# Patient Record
Sex: FEMALE | Race: WHITE | NOT HISPANIC OR LATINO
[De-identification: names, ages, dates, MRNs, and addresses within clinical notes are randomized per-mention and may not be internally consistent; named-entity substitution may affect disease eponyms.]

---

## 2017-06-29 ENCOUNTER — FORM ENCOUNTER (OUTPATIENT)
Age: 72
End: 2017-06-29

## 2022-09-29 DIAGNOSIS — Z86.59 PERSONAL HISTORY OF OTHER MENTAL AND BEHAVIORAL DISORDERS: ICD-10-CM

## 2022-09-29 DIAGNOSIS — M19.072 PRIMARY OSTEOARTHRITIS, LEFT ANKLE AND FOOT: ICD-10-CM

## 2022-09-29 DIAGNOSIS — M72.2 PLANTAR FASCIAL FIBROMATOSIS: ICD-10-CM

## 2022-09-29 DIAGNOSIS — Z98.890 OTHER SPECIFIED POSTPROCEDURAL STATES: ICD-10-CM

## 2022-09-29 DIAGNOSIS — Z80.9 FAMILY HISTORY OF MALIGNANT NEOPLASM, UNSPECIFIED: ICD-10-CM

## 2022-09-29 DIAGNOSIS — S90.229A CONTUSION OF UNSPECIFIED LESSER TOE(S) WITH DAMAGE TO NAIL, INITIAL ENCOUNTER: ICD-10-CM

## 2022-09-29 PROBLEM — Z00.00 ENCOUNTER FOR PREVENTIVE HEALTH EXAMINATION: Status: ACTIVE | Noted: 2022-09-29

## 2022-09-29 RX ORDER — DICLOFENAC EPOLAMINE 0.01 G/1
1.3 SYSTEM TOPICAL
Refills: 0 | Status: ACTIVE | COMMUNITY

## 2022-09-29 RX ORDER — BUPROPION HYDROCHLORIDE 100 MG/1
100 TABLET, FILM COATED ORAL
Refills: 0 | Status: ACTIVE | COMMUNITY

## 2022-09-30 ENCOUNTER — APPOINTMENT (OUTPATIENT)
Dept: PODIATRY | Facility: CLINIC | Age: 77
End: 2022-09-30

## 2022-09-30 VITALS — WEIGHT: 130 LBS | BODY MASS INDEX: 23.92 KG/M2 | HEIGHT: 62 IN

## 2022-09-30 PROCEDURE — 11721 DEBRIDE NAIL 6 OR MORE: CPT | Mod: 59

## 2022-09-30 PROCEDURE — 11750 EXCISION NAIL&NAIL MATRIX: CPT | Mod: T5

## 2022-10-07 NOTE — HISTORY OF PRESENT ILLNESS
[Sneakers] : roddy [FreeTextEntry1] : Patient presents today for management of painful onychomycotic nails as well as right lateral hallux ingrown toenail that she has had for several years.  She has been managing it by cutting it herself as well as continued debridement; however, there is a nail spicule there and she finds it very difficult to last in between visits.  She denies any purulent drainage or redness or swelling to the area.  She has tried to cut it herself without any improvement.

## 2022-10-07 NOTE — PHYSICAL EXAM
[Ankle Swelling Bilaterally] : bilaterally  [2+] : left foot dorsalis pedis 2+ [Sensation] : the sensory exam was normal to light touch and pinprick [No Focal Deficits] : no focal deficits [Deep Tendon Reflexes (DTR)] : deep tendon reflexes were 2+ and symmetric [Motor Exam] : the motor exam was normal [Ankle Swelling (On Exam)] : not present [Varicose Veins Of Lower Extremities] : not present [] : not present [FreeTextEntry3] : CFT: 3 seconds x 10.  Temperature gradient: warm to cool. [de-identified] : Forefoot varus, fully compensated.\par  [FreeTextEntry1] : Nails 1 through 5, bilateral are elongated, thickened, dystrophic with pain on palpation, distally.  Thickened to 3mm with yellow discoloration and subungual debris.  \par Left hallux subungual hematoma has been growing out.  The nail is well attached.  \par The right lateral hallux incurvated nail border with a spicule causing pain on palpation.  No clinical signs of infection, no edema, redness, increased temperature, cellulitis, drainage, purulence, malodor, soft tissue crepitus or streaking.\par

## 2022-10-07 NOTE — ASSESSMENT
[FreeTextEntry1] : Impression: Onychomycosis (B35.1).  Pain in left toes, pain in right toes (M79). Ingrown right hallux nail (L60.0).\par \par Treatment: For the onychomycosis, patient has tried topical Ketoconazole in the past without improvement and it has given her a rash.  She does not wish to continue with the topical antifungals.\par Nails right 2 through 5 and left 1 through 5 were debrided of excessive thickness and length to appropriate levels of comfort and contour using sterile nippers and Dremel.   The procedure was tolerated well without complications.\par For the right hallux ingrown toenail, discussed treatment options with the patient.  Patient prefers to have a partial nail avulsion with chemical matrixectomy as a permanent option.\par \par The consent was signed with the patient for the planned surgical procedure.  The procedure was reviewed; discussed risks and benefits.  All questions answered.  \par Procedure: Partial nail avulsion with matrixectomy with chemical matrixectomy.\par Location: Right hallux, lateral border.\par \par Report: After the digit was prepped with alcohol.  Local anesthesia was administered using 3cc’s of 1% Lidocaine, plain.  The digit was prepped with Betadine solution.  A Penrose tourniquet was applied around the base of the digit.  The symptomatic nail border was freed from its soft tissue attachment using a [place] elevator and excised en toto using a straight nail splitter and a hemostat.  The surgical site was inspected for spicules and none were found.  Two applications of 60 seconds, each, of Phenol were applied to the nail matrix.  The site was debrided using a curette in between Phenol applications.  The tourniquet was removed.  \par The site was then flushed with isopropyl alcohol.  The toe dressed with light compressive dressing and Betadine solution.  Bleeding was minimal and controlled by pressure.  \par \par Pathology: None sent\par \par Patient tolerated the procedure and anesthesia well.  Written and oral postoperative instructions were given to the patient and his mother.  Remove the operative dressing in 24 hours after the procedure and begin warm water and Epsom salt soaks, daily.  Dry the foot well and then apply one drop of Betadine or a thin layer of antibiotic cream over the area and cover with a Band-Aid until follow up appointment.  \par \par Patient to return in 2 weeks.  \par \par

## 2022-10-17 ENCOUNTER — APPOINTMENT (OUTPATIENT)
Dept: PODIATRY | Facility: CLINIC | Age: 77
End: 2022-10-17

## 2022-10-17 VITALS — BODY MASS INDEX: 23.92 KG/M2 | WEIGHT: 130 LBS | HEIGHT: 62 IN

## 2022-10-17 PROCEDURE — 99212 OFFICE O/P EST SF 10 MIN: CPT

## 2022-10-22 NOTE — ASSESSMENT
[FreeTextEntry1] : Impression: Ingrown right hallux nail (L60.0).\par \par Treatment: Patient can discontinue all antibiotic creams and soaking.  Just monitor the area for any recurrence.  \par Return: 66 days for routine debridement.

## 2022-10-22 NOTE — HISTORY OF PRESENT ILLNESS
[Other: ____] : [unfilled] [FreeTextEntry1] : Patient presents today for follow up evaluation of right, lateral hallux partial nail avulsion with matrixectomy, 2 weeks ago.  Patient states that it is feeling a lot better.  There is no redness, drainage or pain to the area. She has been soaking it and applying antibiotic cream to the area but inconsistently.

## 2022-10-22 NOTE — PHYSICAL EXAM
[Ankle Swelling Bilaterally] : bilaterally  [2+] : left foot dorsalis pedis 2+ [Sensation] : the sensory exam was normal to light touch and pinprick [No Focal Deficits] : no focal deficits [Deep Tendon Reflexes (DTR)] : deep tendon reflexes were 2+ and symmetric [Motor Exam] : the motor exam was normal [Ankle Swelling (On Exam)] : not present [Varicose Veins Of Lower Extremities] : not present [] : not present [FreeTextEntry3] : CFT: 3 seconds x 10.  Temperature gradient: warm to cool. [de-identified] : Forefoot varus, fully compensated.\par  [FreeTextEntry1] : Nails 1 through 5, bilateral are elongated, thickened, dystrophic with pain on palpation, distally.  Thickened to 3mm with yellow discoloration and subungual debris.  \par Left hallux subungual hematoma has been growing out.  The nail is well attached.  \par The right lateral hallux, S/P P&A with no residual spicules, no clinical signs of infection.

## 2022-11-26 ENCOUNTER — EMERGENCY (EMERGENCY)
Facility: HOSPITAL | Age: 77
LOS: 1 days | Discharge: HOME | End: 2022-11-26
Attending: STUDENT IN AN ORGANIZED HEALTH CARE EDUCATION/TRAINING PROGRAM
Payer: MEDICARE

## 2022-11-26 VITALS
TEMPERATURE: 98 F | SYSTOLIC BLOOD PRESSURE: 120 MMHG | RESPIRATION RATE: 16 BRPM | OXYGEN SATURATION: 96 % | HEART RATE: 89 BPM | WEIGHT: 175.05 LBS | HEIGHT: 63 IN | DIASTOLIC BLOOD PRESSURE: 80 MMHG

## 2022-11-26 PROCEDURE — 73110 X-RAY EXAM OF WRIST: CPT | Mod: 26,LT

## 2022-11-26 PROCEDURE — 73080 X-RAY EXAM OF ELBOW: CPT | Mod: 26,LT

## 2022-11-26 PROCEDURE — 99284 EMERGENCY DEPT VISIT MOD MDM: CPT

## 2022-11-26 RX ORDER — TETANUS TOXOID, REDUCED DIPHTHERIA TOXOID AND ACELLULAR PERTUSSIS VACCINE, ADSORBED 5; 2.5; 8; 8; 2.5 [IU]/.5ML; [IU]/.5ML; UG/.5ML; UG/.5ML; UG/.5ML
0.5 SUSPENSION INTRAMUSCULAR ONCE
Refills: 0 | Status: COMPLETED | OUTPATIENT
Start: 2022-11-26 | End: 2022-11-26

## 2022-11-26 RX ORDER — ACETAMINOPHEN 500 MG
975 TABLET ORAL ONCE
Refills: 0 | Status: COMPLETED | OUTPATIENT
Start: 2022-11-26 | End: 2022-11-26

## 2022-11-26 RX ORDER — IBUPROFEN 200 MG
600 TABLET ORAL ONCE
Refills: 0 | Status: COMPLETED | OUTPATIENT
Start: 2022-11-26 | End: 2022-11-26

## 2022-11-26 RX ORDER — OXYCODONE HYDROCHLORIDE 5 MG/1
5 TABLET ORAL ONCE
Refills: 0 | Status: DISCONTINUED | OUTPATIENT
Start: 2022-11-26 | End: 2022-11-26

## 2022-11-26 RX ADMIN — OXYCODONE HYDROCHLORIDE 5 MILLIGRAM(S): 5 TABLET ORAL at 22:35

## 2022-11-26 RX ADMIN — Medication 600 MILLIGRAM(S): at 22:35

## 2022-11-26 RX ADMIN — Medication 975 MILLIGRAM(S): at 22:35

## 2022-11-26 NOTE — ED PROVIDER NOTE - CARE PROVIDER_API CALL
Salud Abdul (MD)  Orthopaedic Surgery  [address] [place] [address], [address]  [place] [address]  Phone: [phone]  Fax: [phone]  Follow Up Time: 1-3 Days

## 2022-11-26 NOTE — ED PROVIDER NOTE - PHYSICAL EXAMINATION
Attending/MD Curtis.     Left Wrist Exam  Sensation: sensation intact to light touch in first dorsalis web space, 5th/3rd finger volar tip.  Motor: right: FPL, FDS, FDP grossly intact per routine. Extensor mechanisms grossly intact per routine. Able to oppose thumb to pinky. No over evidence of malrotation. Median recurrent nerve intact to fine touch per routine.  ROM: right: wrist frexion, extension, AB/AD-duction intact to rom.  Skin/Inspection: No erythema, + small abrasion, + derformitie on the distal portion of fore arm. Inspection: No snuff box tenderness.  Vascular: CRT<2sec in all digits.

## 2022-11-26 NOTE — ED PROVIDER NOTE - CLINICAL SUMMARY MEDICAL DECISION MAKING FREE TEXT BOX
Attending/MD Curtis. 76 yo F, no known pMH, mechanical fall, today, FOOSH, deformity of the left distal fore arm, high suspicion for distal radial fracture with displacement, pt is in comfort but will need reduction, giving pain meds, now and xray, ortho, likely dc with ortho out pt follow up.

## 2022-11-26 NOTE — ED ADULT TRIAGE NOTE - CHIEF COMPLAINT QUOTE
fall down 2-3 steps, hit head- only a scratch on the nose. denies thinner use, denies LOC, denies headache. left wrist deformity noted, endorsing pain

## 2022-11-26 NOTE — ED PROVIDER NOTE - NSFOLLOWUPINSTRUCTIONS_ED_ALL_ED_FT
Thank you for visiting our Emergency Department, it has been a pleasure taking part in your healthcare.    You had a thorough evaluation including an exam, labs and imaging. You were given medications for comfort. Your workup did not demonstrate any concerning findings. This does not mean that your pain is not real, only that we were unable to find a dangerous or life-threatening cause. Please read the attached information sheets as they will provide useful information regarding your condition.    Your discharge diagnosis is: left radial fracture  Return precautions to the Emergency Department include but are not limited to: unrelenting nausea, vomiting, fever, chest pain, shortness of breath, chest or abdominal pain, worsening back pain, syncope, blood in urine or stool, headache that doesn't resolve, numbness or tingling, loss of sensation, loss of motor function, or any other concerning symptoms.    1) Follow up with your primary care doctor within 48 hours. Please call [phone] to make an appointment or with any questions you may have.  or call [phone] to make an appointment with the clinic  2) You should also establish care with Orthopedics by calling  [phone] to find a doctor affiliated with [facility] in your neighborhood & network. Please bring your labs and imaging with you to your appointment, as needed.  3) Take Tylenol 650-1000mg every six hours and supplement with ibuprofen 400-600mg, with food, every six hours which can be taken three hours apart from the Tylenol to have a layered effect. Please do not take these medications if you do no have pain or if you have any history of bleeding disorders, kidney or liver disease. Do not use ibuprofen if you are on blood thinners (anti-coagulation).  4) Drink at least 2 Liters or 64 Ounces of water each day.

## 2022-11-26 NOTE — ED PROVIDER NOTE - ATTENDING CONTRIBUTION TO CARE
I have personally seen and examined this patient.  I have fully participated in the care of this patient. I performed a substantive portion of the visit including all aspects of the medical decision making. I have reviewed all pertinent clinical information, including history, physical exam, plan and the Resident’s note and agree except as noted. - MD Curtis.    see mdm

## 2022-11-26 NOTE — ED PROVIDER NOTE - PATIENT PORTAL LINK FT
You can access the FollowMyHealth Patient Portal offered by [facility] by registering at the following website: [email or web address]. By joining [facility]’s FollowMyHealth portal, you will also be able to view your health information using other applications (apps) compatible with our system.

## 2022-11-26 NOTE — ED PROVIDER NOTE - PROGRESS NOTE DETAILS
Attending/MD Curtis. ortho resident paged 20 minutes ago, pending ortho consult, distal radial and ulnar fractures, Attending/MD Curtis. ortho resident, Arvin Wren, paged 20 minutes ago, pending ortho, distal radial and ulnar fractures, Christiano Dewitt MD: Patient seen by ortho and had wrist reduced and splinted with satisfactory post-reduction films and recommending outpatient follow up in 3 days.

## 2022-11-26 NOTE — ED PROVIDER NOTE - OBJECTIVE STATEMENT
77y F w/ no pertinent PMHx presents to the ED c/o B/L hand pain s/p mechanical trip and fall down stairs earlier today. Denies LOC, head trauma. Denies AC use. Denies numbness, tingling, weakness. Pt is not UTD w/ TDAP. 77y F w/ no pertinent PMHx presents to the ED c/o B/L hand pain s/p mechanical trip and fall down stairs earlier today. Denies LOC, head trauma. Denies AC use. Denies numbness, tingling, weakness. Pt is not UTD w/ TDAP. NKDA.

## 2022-11-26 NOTE — ED ADULT NURSE NOTE - OBJECTIVE STATEMENT
76yo F with no PMH presents to ED s/p fall. Pt states she had a mechanical trip and fall downs the stairs earlier today and is now having b/l hand pain more to the left wrist. Pt was able to ambulate fine after fall. Pt endorses 5/10 pain, denies taking anything for it. Denies chest pain, SOB, N/V, headstrike, LOC, blood thinner use, weakness, lightheadedness, numbness/tingling to extremities. A&Ox3. Strong peripheral pulses. Neurologically intact and follows commands. Pulse motor and sensation intact to all 4 extremities. Minor swelling to left wrist, no other obvious swelling, bruising, and deformities on inspection. Skin warm dry intact and normal for ethnicity. Ambulatory with steady gait in ED. Stretcher locked and in lowest position, appropriate side rails up. Pt instructed to notify RN if assistance is needed.

## 2022-11-27 VITALS
OXYGEN SATURATION: 96 % | RESPIRATION RATE: 18 BRPM | SYSTOLIC BLOOD PRESSURE: 116 MMHG | DIASTOLIC BLOOD PRESSURE: 70 MMHG | HEART RATE: 68 BPM

## 2022-11-27 PROCEDURE — 73130 X-RAY EXAM OF HAND: CPT | Mod: 26,LT

## 2022-11-27 PROCEDURE — 99285 EMERGENCY DEPT VISIT HI MDM: CPT | Mod: 25

## 2022-11-27 PROCEDURE — 90715 TDAP VACCINE 7 YRS/> IM: CPT

## 2022-11-27 PROCEDURE — 25605 CLTX DST RDL FX/EPHYS SEP W/: CPT | Mod: LT

## 2022-11-27 PROCEDURE — 73080 X-RAY EXAM OF ELBOW: CPT

## 2022-11-27 PROCEDURE — 73090 X-RAY EXAM OF FOREARM: CPT

## 2022-11-27 PROCEDURE — 73110 X-RAY EXAM OF WRIST: CPT

## 2022-11-27 PROCEDURE — 73130 X-RAY EXAM OF HAND: CPT

## 2022-11-27 PROCEDURE — 73090 X-RAY EXAM OF FOREARM: CPT | Mod: 26,LT

## 2022-11-27 PROCEDURE — 73110 X-RAY EXAM OF WRIST: CPT | Mod: 26,LT

## 2022-11-27 PROCEDURE — 90471 IMMUNIZATION ADMIN: CPT

## 2022-11-27 RX ADMIN — TETANUS TOXOID, REDUCED DIPHTHERIA TOXOID AND ACELLULAR PERTUSSIS VACCINE, ADSORBED 0.5 MILLILITER(S): 5; 2.5; 8; 8; 2.5 SUSPENSION INTRAMUSCULAR at 00:09

## 2022-11-27 NOTE — CONSULT NOTE ADULT - SUBJECTIVE AND OBJECTIVE BOX
77yFemale presents to [facility] ED c/o severe L wrist pain s/p mechanical fall. Patient denies head hit or LOC. Localizing pain to distal radius. Denies radiation of pain. Pt denies numbness, tingling or burning. R Hand Dominant. Patient denies any other injuries.    PMH:  No pertinent past medical history      PSH:  No significant past surgical history      AH:    Meds: See med rec    T(C): 36.8 (11-26-22 @ 23:15)  HR: 82 (11-26-22 @ 23:15)  BP: 132/78 (11-26-22 @ 23:15)  RR: 16 (11-26-22 @ 23:15)  SpO2: 97% (11-26-22 @ 23:15)  Wt(kg): --    PE L UE:  Skin intact, visible deformity of wrist, + soft tissue swelling, no ecchymosis; Decreased ROM of Wrist 2/2 pain. Normal Elbow/Shoulder ROM w/o pain. + TTP over DR/Ulna. + Rad Pulse 2+/4. SILT C5-T1, +AIN/PIN/Ulnar/Radial/Musc/Median, soft compartments;    R UE / B/L LE:  No bony TTP; Good ROM w/o pain. Able to SLR B/L. Exam Unremarkable.     Imaging:  XRay L Wrist  3 views of L Wrist demonstrates R/L distal radius fracture, intra-articular w/ anterior displacement of carpus/hand in relation to forearm. No other fx/dislocations noted.     Procedure Note:  After verbal consent obtained, ~ 10 cc of 1% Lidocaine injected into area around DR/Ulna as hematoma block. UE hung by fingers and reduction maneuver performed. Sugartong splint applied to Forearm/Wrist and mold held. VT XR obtained which show improved alignment of L DR Fracture. Pt NVI post procedure. Pt tolerated procedure well.    A/P: 77yFemale s/p Mech Fall w/ L Distal Radius Fracture  - Pain control  - Strict Ice/Elevation  - NWB L UE with splint and sling  - Keep splint clean/dry/intact;  - Encourage active finger motion to help with swelling  - Pt aware of possible need for surgical intervention of distal radius fracture. Will FU as outpatient  - Pt made aware of signs and symptoms of compartment syndrome. Aware of need to contact Doctor / Return to ED if symtoms arise.  - All Pt's / Family Members questions answered, Pt/family understand plan.  - MEENA w/ Dr. Abdul in 2-3 days.

## 2022-12-20 ENCOUNTER — APPOINTMENT (OUTPATIENT)
Dept: PODIATRY | Facility: CLINIC | Age: 77
End: 2022-12-20
Payer: MEDICARE

## 2022-12-20 PROBLEM — Z78.9 OTHER SPECIFIED HEALTH STATUS: Chronic | Status: ACTIVE | Noted: 2022-11-26

## 2022-12-20 PROCEDURE — 73630 X-RAY EXAM OF FOOT: CPT | Mod: RT

## 2022-12-20 PROCEDURE — 99213 OFFICE O/P EST LOW 20 MIN: CPT | Mod: 25

## 2022-12-20 PROCEDURE — 11721 DEBRIDE NAIL 6 OR MORE: CPT

## 2022-12-28 NOTE — PHYSICAL EXAM
[2+] : left foot dorsalis pedis 2+ [Sensation] : the sensory exam was normal to light touch and pinprick [No Focal Deficits] : no focal deficits [Deep Tendon Reflexes (DTR)] : deep tendon reflexes were 2+ and symmetric [Motor Exam] : the motor exam was normal [Ankle Swelling (On Exam)] : not present [Varicose Veins Of Lower Extremities] : not present [] : not present [FreeTextEntry3] : CFT: 3 seconds x10. Temperature gradient: warm to cool. [de-identified] : Right 5th toe redness and swelling. No increase in warmth. Mild pain on palpation. No limitation on DIPJ, PIPJ or MPJ ROM. No crepitus. Muscle Power 5/5 of all pedal groups. [FreeTextEntry1] : Nails 1 through 5, bilateral are elongated, thickened, dystrophic with pain on palpation, distally. Thickened to 3mm with yellow discoloration and subungual debris. Left hallux subungual hematoma has been growing out. The nail is well attached.  Patient is S/P right lateral hallux PYA with no recurrence and no nail spicules. No open lesions. No clinical signs of infection.\par

## 2022-12-28 NOTE — PROCEDURE
[FreeTextEntry1] : \par X-ray Report: Right foot - 3 views, weight-bearing were taken to evaluate for underlying fracture. There is nondisplaced radiolucency of 5th proximal phalanx head going through the PIPJ with no displacement and no diastasis. No other fractures, erosions or dislocations present.

## 2022-12-28 NOTE — ASSESSMENT
[FreeTextEntry1] : \par Impression:  Onychomycosis. Pain in toes bilateral. Right 5th toe proximal phalanx nondisplaced fracture, closed, initial.\par \par Treatment: For the right 5th toe fracture I advised patient to wear shoes with an adequately wide and tall toe box to tolerance. Avoid prolonged physical activity. We will treat conservatively as there is already signs of healing on the x-ray. If the toe is still painful in 3 to 4 weeks I advised her to return for repeat imaging. Protect the toe from further injury.\par For onychomycosis, nails 1 through 5 bilaterally were debrided of excessive thickness and length to appropriate levels of comfort and contour using sterile nippers and Dremel.   The procedure was tolerated well without complications.  \par I will see her back in 66 days.\par

## 2022-12-28 NOTE — HISTORY OF PRESENT ILLNESS
[FreeTextEntry1] : Patient returns today for management of painful elongated onychomycotic toenails as well as a new issue of right 5th toe pain and redness. The patient states that she fell on November 26th and injured her left wrist and had to have left wrist surgery. She thinks that she might have banged her toe however she did not seek any medical care. It has been slowly improving in pain however there is still redness and swelling.\par

## 2023-02-28 ENCOUNTER — APPOINTMENT (OUTPATIENT)
Dept: PODIATRY | Facility: CLINIC | Age: 78
End: 2023-02-28
Payer: MEDICARE

## 2023-02-28 DIAGNOSIS — S92.514A NONDISPLACED FRACTURE OF PROXIMAL PHALANX OF RIGHT LESSER TOE(S), INITIAL ENCOUNTER FOR CLOSED FRACTURE: ICD-10-CM

## 2023-02-28 PROCEDURE — 99212 OFFICE O/P EST SF 10 MIN: CPT | Mod: 25

## 2023-02-28 PROCEDURE — 11721 DEBRIDE NAIL 6 OR MORE: CPT

## 2023-02-28 PROCEDURE — 73630 X-RAY EXAM OF FOOT: CPT | Mod: RT

## 2023-03-01 PROBLEM — S92.514A CLOSED NONDISPLACED FRACTURE OF PROXIMAL PHALANX OF LESSER TOE OF RIGHT FOOT, INITIAL ENCOUNTER: Status: ACTIVE | Noted: 2022-12-21

## 2023-03-01 NOTE — REASON FOR VISIT
[Follow-Up Visit] : a follow-up visit for [Confirmed Foot Fracture] : confirmed foot fracture [Onychomycosis] : onychomycosis

## 2023-03-04 NOTE — HISTORY OF PRESENT ILLNESS
[Sneakers] : roddy [FreeTextEntry1] : Patient returns today for management of painful elongated onychomycotic toenails as well as for follow up of right 5th toe fracture that she sustained on 11/26/22.  She states that overall, the toe has been feeling better.  She still gets some mild red discoloration of the toe at the end of the day and she feels occasional discomfort when she touches the toe.  She is otherwise able to do all of her activities of daily living and wear shoe gear.

## 2023-03-04 NOTE — PHYSICAL EXAM
[Ankle Swelling Bilaterally] : bilaterally  [2+] : left foot dorsalis pedis 2+ [Sensation] : the sensory exam was normal to light touch and pinprick [No Focal Deficits] : no focal deficits [Deep Tendon Reflexes (DTR)] : deep tendon reflexes were 2+ and symmetric [Motor Exam] : the motor exam was normal [Ankle Swelling (On Exam)] : not present [Varicose Veins Of Lower Extremities] : not present [] : not present [FreeTextEntry3] : CFT: 3 seconds x 10.  Temperature gradient: warm to cool. [de-identified] : Right 5th toe, minimal redness and swelling compared to contralateral limb.  Increased in warmth.  Minimal pain on palpation.  No limitation in pedal joints ROM, no crepitus.  Muscle power: 5/5, all pedal groups.  [FreeTextEntry1] : Nails 1 through 5, bilateral are elongated, thickened, dystrophic with pain on palpation, distally.  Thickened to 3mm with yellow discoloration and subungual debris.  \par Left hallux subungual hematoma has been growing out.  The nail is well attached.  \par The right lateral hallux, S/P P&A with no residual spicules, no clinical signs of infection.

## 2023-03-04 NOTE — ASSESSMENT
[FreeTextEntry1] : Impression: Onychomycosis (B35.1).  Pain in toes, bilateral (M79).  Right 5th proximal phalanx non-displaced fracture, healed, sequela (S92.160S).\par \par Treatment: For the right 5th toe fracture, I reviewed the x-rays with the patient.  Advised patient to continue activities to tolerance and wear shoes with a wide/tall toebox.  The fracture has healed with immature bone; this is a normal fracture healing progression.  Advised patient to protect the area from re-injury.  \par Nails 1 through 5, bilaterally were debrided of excessive thickness and length to appropriate levels of comfort and contour using sterile nippers and Dremel.   The procedure was tolerated well without complications.\par Failure to perform this treatment based on patient's underlying condition could lead to ulceration and infection.\par Return: 64 days.

## 2023-03-04 NOTE — PROCEDURE
[FreeTextEntry1] : X-rays of the right foot were taken to evaluate for healing of fracture.\par (3 views - weight bearing) The previously seen radiolucency of the 5th proximal phalangeal head is fully consolidated.  No other fracture/dislocations or erosions.  Incomplete synostosis of 5th middle and distal phalanges unchanged from prior.

## 2023-05-09 ENCOUNTER — APPOINTMENT (OUTPATIENT)
Dept: PODIATRY | Facility: CLINIC | Age: 78
End: 2023-05-09

## 2023-05-18 ENCOUNTER — APPOINTMENT (OUTPATIENT)
Dept: PODIATRY | Facility: CLINIC | Age: 78
End: 2023-05-18

## 2023-06-08 ENCOUNTER — APPOINTMENT (OUTPATIENT)
Dept: PODIATRY | Facility: CLINIC | Age: 78
End: 2023-06-08
Payer: MEDICARE

## 2023-06-08 PROCEDURE — 11721 DEBRIDE NAIL 6 OR MORE: CPT

## 2023-06-10 NOTE — PHYSICAL EXAM
[Ankle Swelling Bilaterally] : bilaterally  [2+] : left foot dorsalis pedis 2+ [Sensation] : the sensory exam was normal to light touch and pinprick [No Focal Deficits] : no focal deficits [Deep Tendon Reflexes (DTR)] : deep tendon reflexes were 2+ and symmetric [Motor Exam] : the motor exam was normal [Ankle Swelling (On Exam)] : not present [Varicose Veins Of Lower Extremities] : not present [] : not present [de-identified] : No limitations on pedal joints ROM.  No crepitus.  Muscle power: 5/5, all pedal groups.  [FreeTextEntry3] : CFT: 3 seconds x 10.  Temperature gradient: warm to cool. [FreeTextEntry1] : Nails 1 through 5, bilateral are elongated, thickened, dystrophic with pain on palpation, distally.  Thickened to 3mm with yellow discoloration and subungual debris.  \par Left hallux, lateral nail border is deeply incurvated with pain without any drainage or clinical signs of infection.

## 2023-06-10 NOTE — HISTORY OF PRESENT ILLNESS
[Sneakers] : roddy [FreeTextEntry1] : Patient returns today for management of painful elongated onychomycotic toenails as well as ingrown nails that she cannot care for herself.  She reports no new medical problems.  The pain in the right 5th toe is resolved.

## 2023-06-10 NOTE — ASSESSMENT
[FreeTextEntry1] : Impression: Onychomycosis (B35.1).  Pain in toes, bilateral (M79).  Ingrown nail (L60.0).\par \par Treatment: \par Nails 1 through 5, bilaterally were debrided of excessive thickness and length to appropriate levels of comfort and contour using sterile nippers and Dremel.   The procedure was tolerated well without complications.\par All incurvated nail borders were resected via slant back with a sterile nipper with resolution of pain.  \par \par Return: 64 days.

## 2023-08-24 ENCOUNTER — APPOINTMENT (OUTPATIENT)
Dept: PODIATRY | Facility: CLINIC | Age: 78
End: 2023-08-24
Payer: MEDICARE

## 2023-08-24 PROCEDURE — 11721 DEBRIDE NAIL 6 OR MORE: CPT

## 2023-08-29 NOTE — ASSESSMENT
[FreeTextEntry1] : Impression: Onychomycosis (B35.1).  Pain in toes, bilateral (M79).  Ingrown nail (L60.0).  Treatment:  Nails 1 through 5, bilaterally were debrided of excessive thickness and length to appropriate levels of comfort and contour using sterile nippers and Dremel.   The procedure was tolerated well without complications. All incurvated nail borders were resected via slant back with a sterile nipper with resolution of pain.   Return: 64 days.

## 2023-08-29 NOTE — PHYSICAL EXAM
[Ankle Swelling Bilaterally] : bilaterally  [2+] : left foot dorsalis pedis 2+ [Sensation] : the sensory exam was normal to light touch and pinprick [No Focal Deficits] : no focal deficits [Deep Tendon Reflexes (DTR)] : deep tendon reflexes were 2+ and symmetric [Motor Exam] : the motor exam was normal [Ankle Swelling (On Exam)] : not present [Varicose Veins Of Lower Extremities] : not present [] : not present [FreeTextEntry3] : CFT: 3 seconds x 10.  Temperature gradient: warm to cool. [de-identified] : No limitations on pedal joints ROM.  No crepitus.  Muscle power: 5/5, all pedal groups.  [FreeTextEntry1] : Nails 1 through 5, bilateral are elongated, thickened, dystrophic with pain on palpation, distally.  Thickened to 3mm with yellow discoloration and subungual debris.  \par  Left hallux, lateral nail border is deeply incurvated with pain without any drainage or clinical signs of infection.

## 2023-08-29 NOTE — HISTORY OF PRESENT ILLNESS
[Sneakers] : roddy [FreeTextEntry1] : Patient returns today for management of painful elongated onychomycotic toenails as well as ingrown nails that she cannot care for herself.  She did attempt to cut the nails; however she was unsuccessful.  She reports no new medical problems.

## 2023-11-02 ENCOUNTER — APPOINTMENT (OUTPATIENT)
Dept: PODIATRY | Facility: CLINIC | Age: 78
End: 2023-11-02
Payer: MEDICARE

## 2023-11-02 PROCEDURE — 11721 DEBRIDE NAIL 6 OR MORE: CPT

## 2023-11-27 NOTE — ED ADULT TRIAGE NOTE - ADDITIONAL SAFETY/BANDS...
H&P reviewed. The patient was examined and there are no changes to the H&P.   Additional Safety/Bands:

## 2024-01-11 ENCOUNTER — APPOINTMENT (OUTPATIENT)
Dept: PODIATRY | Facility: CLINIC | Age: 79
End: 2024-01-11

## 2024-02-13 ENCOUNTER — APPOINTMENT (OUTPATIENT)
Dept: PODIATRY | Facility: CLINIC | Age: 79
End: 2024-02-13

## 2024-02-15 ENCOUNTER — APPOINTMENT (OUTPATIENT)
Dept: PODIATRY | Facility: CLINIC | Age: 79
End: 2024-02-15
Payer: MEDICARE

## 2024-02-15 PROCEDURE — 11721 DEBRIDE NAIL 6 OR MORE: CPT

## 2024-02-15 PROCEDURE — 99212 OFFICE O/P EST SF 10 MIN: CPT | Mod: 25

## 2024-02-21 NOTE — HISTORY OF PRESENT ILLNESS
[FreeTextEntry1] : Patient returns today for management of painful onychomycotic nails and ingrowns. Patient states that her right hallux lateral nail border has been painful for the past week. She denies any drainage or trauma. Denies any fevers or chills. No new medical changes.

## 2024-02-21 NOTE — PHYSICAL EXAM
[2+] : left foot dorsalis pedis 2+ [Sensation] : the sensory exam was normal to light touch and pinprick [No Focal Deficits] : no focal deficits [Deep Tendon Reflexes (DTR)] : deep tendon reflexes were 2+ and symmetric [Motor Exam] : the motor exam was normal [Ankle Swelling (On Exam)] : not present [Varicose Veins Of Lower Extremities] : not present [] : not present [FreeTextEntry3] : CFT: 3 seconds x10. Temperature gradient: warm to cool. [de-identified] : No limitations on pedal joints ROM. No crepitus. Muscle power: 5/5, all pedal groups. [FreeTextEntry1] : Right hallux lateral nail border incurvated with pain on palpation. Mild redness and swelling without increased temperature, purulence, drainage or malodor, spicules or granulomas. Nails 1 through 5, bilateral are elongated, thickened, dystrophic with pain on palpation, distally. Thickened to 3mm with yellow discoloration and subungual debris consistent with onychomycosis.

## 2024-02-21 NOTE — ASSESSMENT
[FreeTextEntry1] : Impression: Onychomycosis with pain. Right hallux ingrown nail with mild paronychia.  Treatment: The right hallux ingrown nail was resected via slant-back. Neosporin dressing was applied. I advised patient to soak her feet in Epsom salt soaks for one week and monitor for any worsening symptoms. If symptoms do not resolve in one week patient is to return back for a partial nail avulsion. Nails 1 through 5 were wiped with alcohol and debrided of excessive thickness and length to appropriate levels of comfort and contour using sterile nippers and Dremel.  The procedure was tolerated well without complications. Will see patient back in 63 days.

## 2024-04-18 ENCOUNTER — APPOINTMENT (OUTPATIENT)
Dept: PODIATRY | Facility: CLINIC | Age: 79
End: 2024-04-18
Payer: MEDICARE

## 2024-04-18 PROCEDURE — 11721 DEBRIDE NAIL 6 OR MORE: CPT

## 2024-04-18 PROCEDURE — 99212 OFFICE O/P EST SF 10 MIN: CPT | Mod: 25

## 2024-04-23 NOTE — HISTORY OF PRESENT ILLNESS
[FreeTextEntry1] : Patient presents today for management of thickened, discolored nails, which she states are painful in shoe gear.  She has a history of recurrent ingrown nails, which have been cut out in the past.  She states she is better compared to previous visit.  Denies any changes in the medical histories or any medications.  She is only anti-depressant medications.  She presents wearing a rounded toebox sneaker type shoe.

## 2024-04-23 NOTE — ASSESSMENT
[FreeTextEntry1] : Impression: Onychomycosis (B35.1).  Xerosis (L85.3).  Pain in toes, bilateral (M79).  Ingrown nail (L60.0).  Treatment: Discussed findings and conditions with the patient.  Discussed pedal care.  She is to soak the nails in a 1:1 mixture of white vinegar and warm water for 10 minutes to help clean the nails and remove subungual debris.  Also, recommended Dr. Hussein's nail cuticle oil which can be applied daily to help with the dried cuticles and surrounding skin.  Avoid walking barefoot.  She is to moisturize feet daily as needed.   All N=nails 1 through 5, bilaterally were prepped and manually and mechanically debrided.  The nails were smoothed and reduced in height with a rotary karl without incident.   The offending nail borders were removed via distal slant backs and the subungual debris was curettaged.   Return: 2 - 3 months.  With any pain, problems or concerns, patient is to contact the office.

## 2024-04-23 NOTE — PHYSICAL EXAM
[Ankle Swelling (On Exam)] : not present [Varicose Veins Of Lower Extremities] : not present [] : not present [FreeTextEntry3] : CFT: 3 seconds x 10.  Temperature gradient: warm to cool. [de-identified] : Decreased ROM of the 1st MPJ, bilateral.  Negative pain, crepitation on ROM.  Muscle strength: 5/5, bilaterally.  [FreeTextEntry1] : Epicritic and light touch sensations intact, bilateral.

## 2024-06-21 ENCOUNTER — APPOINTMENT (OUTPATIENT)
Dept: PODIATRY | Facility: CLINIC | Age: 79
End: 2024-06-21
Payer: MEDICARE

## 2024-06-21 DIAGNOSIS — L60.0 INGROWING NAIL: ICD-10-CM

## 2024-06-21 DIAGNOSIS — B35.1 TINEA UNGUIUM: ICD-10-CM

## 2024-06-21 DIAGNOSIS — L85.3 XEROSIS CUTIS: ICD-10-CM

## 2024-06-21 DIAGNOSIS — M79.674 PAIN IN RIGHT TOE(S): ICD-10-CM

## 2024-06-21 DIAGNOSIS — M79.675 PAIN IN RIGHT TOE(S): ICD-10-CM

## 2024-06-21 PROCEDURE — 11721 DEBRIDE NAIL 6 OR MORE: CPT

## 2024-06-24 PROBLEM — L60.0 INGROWN NAIL: Status: ACTIVE | Noted: 2022-10-04

## 2024-06-24 PROBLEM — B35.1 ONYCHOMYCOSIS: Status: ACTIVE | Noted: 2022-09-29

## 2024-06-24 PROBLEM — M79.674 PAIN IN TOES OF BOTH FEET: Status: ACTIVE | Noted: 2022-10-04

## 2024-06-24 PROBLEM — L85.3 XEROSIS OF SKIN: Status: ACTIVE | Noted: 2024-04-19

## 2024-08-23 ENCOUNTER — APPOINTMENT (OUTPATIENT)
Dept: PODIATRY | Facility: CLINIC | Age: 79
End: 2024-08-23
Payer: MEDICARE

## 2024-08-23 DIAGNOSIS — B35.1 TINEA UNGUIUM: ICD-10-CM

## 2024-08-23 DIAGNOSIS — L85.3 XEROSIS CUTIS: ICD-10-CM

## 2024-08-23 DIAGNOSIS — M79.674 PAIN IN RIGHT TOE(S): ICD-10-CM

## 2024-08-23 DIAGNOSIS — M79.675 PAIN IN RIGHT TOE(S): ICD-10-CM

## 2024-08-23 PROCEDURE — 11721 DEBRIDE NAIL 6 OR MORE: CPT

## 2024-08-29 NOTE — HISTORY OF PRESENT ILLNESS
[FreeTextEntry1] : Patient presents today for painful elongated nails, which she states she cannot cut herself and they become painful especially within shoe gear due to the thickening and elongation. She states she has seen slight improvement over the last several months but continues to have pain and recurrent ingrowing nails bilateral hallux nails in particularly lateral aspects. She denies any changes in her medical conditions or medications.  She is currently working and has to wear certain shoes to work.

## 2024-08-29 NOTE — PHYSICAL EXAM
[2+] : left foot dorsalis pedis 2+ [Sensation] : the sensory exam was normal to light touch and pinprick [No Focal Deficits] : no focal deficits [Deep Tendon Reflexes (DTR)] : deep tendon reflexes were 2+ and symmetric [Motor Exam] : the motor exam was normal [Ankle Swelling (On Exam)] : not present [Varicose Veins Of Lower Extremities] : not present [] : not present [FreeTextEntry3] : CFT: 3 seconds x10. Temperature gradient: warm to cool. [de-identified] : Decreased ROM of the 1st MPJ, bilateral. Negative pain, crepitation on ROM. Muscle strength: 5/5, bilaterally. [FreeTextEntry1] : Epicritic sensation and light touch are intact bilateral.

## 2024-08-29 NOTE — PHYSICAL EXAM
[2+] : left foot dorsalis pedis 2+ [Sensation] : the sensory exam was normal to light touch and pinprick [No Focal Deficits] : no focal deficits [Deep Tendon Reflexes (DTR)] : deep tendon reflexes were 2+ and symmetric [Motor Exam] : the motor exam was normal [Ankle Swelling (On Exam)] : not present [Varicose Veins Of Lower Extremities] : not present [] : not present [FreeTextEntry3] : CFT: 3 seconds x10. Temperature gradient: warm to cool. [de-identified] : Decreased ROM of the 1st MPJ, bilateral. Negative pain, crepitation on ROM. Muscle strength: 5/5, bilaterally. [FreeTextEntry1] : Epicritic sensation and light touch are intact bilateral.

## 2024-08-29 NOTE — PHYSICAL EXAM
[2+] : left foot dorsalis pedis 2+ [Sensation] : the sensory exam was normal to light touch and pinprick [No Focal Deficits] : no focal deficits [Deep Tendon Reflexes (DTR)] : deep tendon reflexes were 2+ and symmetric [Motor Exam] : the motor exam was normal [Ankle Swelling (On Exam)] : not present [Varicose Veins Of Lower Extremities] : not present [] : not present [FreeTextEntry3] : CFT: 3 seconds x10. Temperature gradient: warm to cool. [de-identified] : Decreased ROM of the 1st MPJ, bilateral. Negative pain, crepitation on ROM. Muscle strength: 5/5, bilaterally. [FreeTextEntry1] : Epicritic sensation and light touch are intact bilateral.

## 2024-08-29 NOTE — ASSESSMENT
[FreeTextEntry1] : Impression: Onychomycosis. Pain in toes. Xerosis.  Treatment: Discussed findings and conditions with the patient. Discussed pedal care. She is to continue to soak the nails in a 1:1 mixture of white vinegar and warm water for 10 minutes to help clean the nails and remove subungual debris. Also, recommended Dr. Hussein's nail cuticle oil which can be applied daily to help with the dried cuticles and surrounding skin. Avoid walking barefoot. She is to moisturize feet daily as needed. All nails 1 through 5, bilaterally were prepped and manually and mechanically debrided. The nails were smoothed and reduced in height with a rotary karl without incident. The offending nail borders were removed via distal slant backs and the subungual debris was curettaged. Areas were flushed with normal saline and Neosporin was applied to the areas. Discussed antifungals with the patient which she declined at this time. Return: 3 months. With any pain, problems or concerns, patient is to contact the office.

## 2024-10-22 ENCOUNTER — APPOINTMENT (OUTPATIENT)
Dept: PODIATRY | Facility: CLINIC | Age: 79
End: 2024-10-22
Payer: MEDICARE

## 2024-10-22 DIAGNOSIS — M79.674 PAIN IN RIGHT TOE(S): ICD-10-CM

## 2024-10-22 DIAGNOSIS — M79.675 PAIN IN RIGHT TOE(S): ICD-10-CM

## 2024-10-22 DIAGNOSIS — L60.0 INGROWING NAIL: ICD-10-CM

## 2024-10-22 DIAGNOSIS — B35.1 TINEA UNGUIUM: ICD-10-CM

## 2024-10-22 PROCEDURE — 11721 DEBRIDE NAIL 6 OR MORE: CPT

## 2025-01-07 ENCOUNTER — APPOINTMENT (OUTPATIENT)
Dept: PODIATRY | Facility: CLINIC | Age: 80
End: 2025-01-07

## 2025-01-08 ENCOUNTER — APPOINTMENT (OUTPATIENT)
Dept: PODIATRY | Facility: CLINIC | Age: 80
End: 2025-01-08
Payer: MEDICARE

## 2025-01-08 DIAGNOSIS — L85.3 XEROSIS CUTIS: ICD-10-CM

## 2025-01-08 DIAGNOSIS — M17.9 OSTEOARTHRITIS OF KNEE, UNSPECIFIED: ICD-10-CM

## 2025-01-08 DIAGNOSIS — B35.1 TINEA UNGUIUM: ICD-10-CM

## 2025-01-08 DIAGNOSIS — M79.674 PAIN IN RIGHT TOE(S): ICD-10-CM

## 2025-01-08 DIAGNOSIS — M79.675 PAIN IN RIGHT TOE(S): ICD-10-CM

## 2025-01-08 PROCEDURE — 11721 DEBRIDE NAIL 6 OR MORE: CPT

## 2025-03-19 ENCOUNTER — APPOINTMENT (OUTPATIENT)
Dept: PODIATRY | Facility: CLINIC | Age: 80
End: 2025-03-19
Payer: MEDICARE

## 2025-03-19 DIAGNOSIS — B35.1 TINEA UNGUIUM: ICD-10-CM

## 2025-03-19 DIAGNOSIS — M79.674 PAIN IN RIGHT TOE(S): ICD-10-CM

## 2025-03-19 DIAGNOSIS — M79.675 PAIN IN RIGHT TOE(S): ICD-10-CM

## 2025-03-19 PROCEDURE — 11721 DEBRIDE NAIL 6 OR MORE: CPT

## 2025-03-20 ENCOUNTER — APPOINTMENT (OUTPATIENT)
Dept: PODIATRY | Facility: CLINIC | Age: 80
End: 2025-03-20

## 2025-05-19 ENCOUNTER — APPOINTMENT (OUTPATIENT)
Dept: PODIATRY | Facility: CLINIC | Age: 80
End: 2025-05-19

## 2025-05-21 ENCOUNTER — APPOINTMENT (OUTPATIENT)
Dept: PODIATRY | Facility: CLINIC | Age: 80
End: 2025-05-21
Payer: MEDICARE

## 2025-05-21 DIAGNOSIS — L60.0 INGROWING NAIL: ICD-10-CM

## 2025-05-21 DIAGNOSIS — M79.675 PAIN IN RIGHT TOE(S): ICD-10-CM

## 2025-05-21 DIAGNOSIS — M79.674 PAIN IN RIGHT TOE(S): ICD-10-CM

## 2025-05-21 DIAGNOSIS — B35.1 TINEA UNGUIUM: ICD-10-CM

## 2025-05-21 PROCEDURE — 11721 DEBRIDE NAIL 6 OR MORE: CPT

## 2025-08-11 ENCOUNTER — APPOINTMENT (OUTPATIENT)
Dept: PODIATRY | Facility: CLINIC | Age: 80
End: 2025-08-11

## 2025-08-11 ENCOUNTER — APPOINTMENT (OUTPATIENT)
Dept: PODIATRY | Facility: CLINIC | Age: 80
End: 2025-08-11
Payer: MEDICARE

## 2025-08-11 DIAGNOSIS — M79.674 PAIN IN RIGHT TOE(S): ICD-10-CM

## 2025-08-11 DIAGNOSIS — M79.675 PAIN IN RIGHT TOE(S): ICD-10-CM

## 2025-08-11 DIAGNOSIS — L60.0 INGROWING NAIL: ICD-10-CM

## 2025-08-11 DIAGNOSIS — B35.1 TINEA UNGUIUM: ICD-10-CM

## 2025-08-11 PROCEDURE — 11721 DEBRIDE NAIL 6 OR MORE: CPT

## 2025-08-18 ENCOUNTER — APPOINTMENT (OUTPATIENT)
Dept: PODIATRY | Facility: CLINIC | Age: 80
End: 2025-08-18